# Patient Record
Sex: FEMALE | Race: WHITE | URBAN - METROPOLITAN AREA
[De-identification: names, ages, dates, MRNs, and addresses within clinical notes are randomized per-mention and may not be internally consistent; named-entity substitution may affect disease eponyms.]

---

## 2017-10-12 ENCOUNTER — OFFICE VISIT (OUTPATIENT)
Dept: INTERNAL MEDICINE | Facility: CLINIC | Age: 62
End: 2017-10-12

## 2017-10-12 ENCOUNTER — TELEPHONE (OUTPATIENT)
Dept: GASTROENTEROLOGY | Facility: CLINIC | Age: 62
End: 2017-10-12

## 2017-10-12 VITALS
RESPIRATION RATE: 20 BRPM | SYSTOLIC BLOOD PRESSURE: 127 MMHG | WEIGHT: 125.4 LBS | DIASTOLIC BLOOD PRESSURE: 80 MMHG | HEART RATE: 81 BPM | BODY MASS INDEX: 19.07 KG/M2 | OXYGEN SATURATION: 99 %

## 2017-10-12 DIAGNOSIS — Z00.00 ROUTINE HISTORY AND PHYSICAL EXAMINATION OF ADULT: Primary | ICD-10-CM

## 2017-10-12 DIAGNOSIS — Z11.59 NEED FOR HEPATITIS C SCREENING TEST: ICD-10-CM

## 2017-10-12 DIAGNOSIS — Z23 NEED FOR PROPHYLACTIC VACCINATION AND INOCULATION AGAINST INFLUENZA: ICD-10-CM

## 2017-10-12 DIAGNOSIS — Z12.11 SPECIAL SCREENING FOR MALIGNANT NEOPLASMS, COLON: ICD-10-CM

## 2017-10-12 DIAGNOSIS — N95.2 VAGINAL ATROPHY: ICD-10-CM

## 2017-10-12 DIAGNOSIS — Z12.31 VISIT FOR SCREENING MAMMOGRAM: ICD-10-CM

## 2017-10-12 DIAGNOSIS — Z83.49 FAMILY HISTORY OF THYROID DISEASE: ICD-10-CM

## 2017-10-12 DIAGNOSIS — Z12.4 SCREENING FOR MALIGNANT NEOPLASM OF CERVIX: ICD-10-CM

## 2017-10-12 ASSESSMENT — PAIN SCALES - GENERAL: PAINLEVEL: NO PAIN (0)

## 2017-10-12 NOTE — PROGRESS NOTES
SUBJECTIVE:  Octavia Casper is a 62 year old female with pmh of   Patient Active Problem List   Diagnosis     Hyperopia with presbyopia of both eyes     who comes in for preventive care examination today.   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    --best friend had an aneurysm (is doing okay)--worried that something like this could happen to her. Wonders if there are any screening tests to look for this. Occasionally gets a crampy sensation in her abdomen at night. Denies ongoing abd pain, no pulsations. No history of tobacco use. No FMH of abdominal aneurysm.  --Had BPPV 1 year ago--did Epley maneuver, symptoms improved. Vertigo recurred when she stopped exercises, so now does this regularly to help prevent vertigo.  --9 grandkids, 4 daughters    Menses:  No LMP recorded. Patient is postmenopausal.     PAP HX:   Last   Lab Results   Component Value Date    PAP NIL 06/25/2013     History of abnormal None    Breast:   Patient performs self breast exams  Yes   Breast concerns No  No results found.  Hx fibrous tissue.    Sexual Hx:  Sexually active  yes, single partner, contraception not required  Partner(s) are  male   IS NOT interested in STD testing    Pregnancy:   G  4 and P  4    Medications and allergies reviewed by me today.     Family History   Problem Relation Age of Onset     Macular Degeneration Maternal Aunt        Social History   Substance Use Topics     Smoking status: Never Smoker     Smokeless tobacco: Never Used     Alcohol use 0.5 oz/week     1 drink(s) per week      Comment: 1 glass of wine everyday       Immunization History   Administered Date(s) Administered     HepB 07/30/2014     Tetanus 09/15/2011     Zoster vaccine, live 05/04/2015         Review Of Systems  10-point ROS reviewed and was negative except otherwise noted in HPI    OBJECTIVE:    /80 (BP Location: Right arm, Patient Position: Chair, Cuff Size: Adult Regular)  Pulse 81  Resp 20  Wt  56.9 kg (125 lb 6.4 oz)  SpO2 99%  BMI 19.07 kg/m2   Wt Readings from Last 1 Encounters:   10/12/17 56.9 kg (125 lb 6.4 oz)     Constitutional: no distress, comfortable, pleasant   Eyes: anicteric, normal extra-ocular movements   Ears, Nose and Throat: tympanic membranes clear, nose clear and free of lesions, throat clear, neck supple with full range of motion, no thyromegaly.   Cardiovascular: regular rate and rhythm, normal S1 and S2, no murmurs, rubs or gallops  Respiratory: clear to auscultation, no wheezes or crackles, normal breath sounds   Gastrointestinal: positive bowel sounds, nontender, no hepatosplenomegaly, no masses   Gentiourinary: normal external genitalia, no enlargement of the Bartholin or Bolton glands, urethra normal, perineum normal and free of lesions, vaginal mucosa with mild atrophy, cervix normal without lesions, no masses or cervical motion tenderness, adnexa without enlargement or lesions  Musculoskeletal: full range of motion, no edema   Skin: no concerning lesions, no jaundice   Breast: no lumps or masses, no nipple discharge, no skin retraction or dimpling  Neurological: cranial nerves grossly intact, normal strength and sensation, reflexes at patella 2+, normal gait, no tremor   Psychological: appropriate mood, demonstrates intact judgment and logical thought processes  Lymphatic: no cervical, clavicular, or axillary lymphadenopathy    ASSESSMENT/PLAN:  Pt is a 62 year old female here for preventive examination    1. Routine history and physical examination of adult  Healthy lifestyle encouraged. Safety, sunscreen,   - Hemoglobin; Future  - Basic metabolic panel; Future    2. Need for hepatitis C screening test  Due for routine screening.  - Hepatitis C Screen Reflex to HCV RNA Quant and Genotype; Future    3. Visit for screening mammogram  Reviewed self breast exam. Due for mammogram. If normal, repeat screening in 2 years.  - MA SCREENING DIGITAL BILAT - Future  (s+30); Future    4.  Screening for malignant neoplasm of cervix  - Pap imaged thin layer screen with HPV - recommended age 30 - 65 years (select HPV order below)  - HPV High Risk Types DNA Cervical    5. Need for prophylactic vaccination and inoculation against influenza  Risks and benefits discussed, vaccine administered in clinic today.  - FLU VACCINE, 3 YRS +, IM  [[id number]]    6. Family history of thyroid disease  Given strong family hx of thyroid disease, will screen TSH today.  - TSH with free T4 reflex; Future    7. Special screening for malignant neoplasms, colon  Due for colonoscopy in the next year. She is asymptomatic, will call to schedule.  - GI Procedure Referral    8. Vaginal atrophy  Can try Premarin cream twice weekly for vaginal dryness.    FOLLOW UP: next preventive care visit    GYN TESTING:  Breast examination performed.Yes   Pelvic examination performed Yes    Pap   Yes  If normal PAP results no paps needed.  STD testing No     PREVENTATIVE TESTING:  Breast cancer screening  Indicated and Ordered  Colorectal screening indicated--within next year Ordered  Osteoporosis screening not indicated. Due for Dexa scan next year. Recommend that patient take 1200 mg calcium in divided doses and 1000 IU of vitamin D on daily basis.   Immunizations reviewed    Labs ordered BMP, TSHReflex, PAP, Hb and Hepatitis C  Counseling was provided in the following areas:  regular exercise  healthy diet/nutrition  osteoporosis prevention/bone health  self breast exam     JAI Bass CNP

## 2017-10-12 NOTE — NURSING NOTE
Chief Complaint   Patient presents with     Establish Care     establish care/provider-physical   Ana Arvizu LPN 9:21 AM on 10/12/2017

## 2017-10-12 NOTE — LETTER
Patient:  Jose Armando Casper  :   1955  MRN:     [id number]        Ms. Jose Armando Casper  [address] [facility] [address]        2017    Dear Ms. Casper,    Thank you for choosing the [facility] for your healthcare needs.  We appreciate the opportunity to serve you.    The following are your recent test results.     Hi Jose Armando,     Your pap smear was normal (no sign of concerning cervical cells). You tested negative for HPV, the virus that causes cervical cancer.     Thank [facility] for choosing [facility]. We appreciate the opportunity to serve you and look forward to supporting your healthcare needs in the future.   If you have any questions or concerns, please call me or my nurse at [phone].       Sincerely,     JAI Bass CNP     Results for orders placed or performed in visit on 10/12/17   HPV High Risk Types DNA Cervical   Result Value Ref Range    HPV 16 DNA Negative NEG^Negative    HPV 18 DNA Negative NEG^Negative    Other HR HPV Negative NEG^Negative    Final Diagnosis This patient's sample is negative for HPV DNA.     Specimen Description Cervical Cells    A pap thin layer screen   Result Value Ref Range    PAP NIL     Copath Report         Patient Name: JOSE ARMANDO CASPER  MR#: [id number]  Specimen #: [id number]  Collected: 10/12/2017  Received: 10/13/2017  Reported: 10/16/2017 10:20  Ordering Phy(s): ANNABELLE MCCORMICK    For improved result formatting, select 'View Enhanced Report Format'  under Linked Documents section.    SPECIMEN/STAIN PROCESS:  Pap thin layer prep screening (Surepath)       Pap-Cyto x 1, HPV ordered x 1    SOURCE: Cervical, endocervical  ----------------------------------------------------------------   Pap thin layer prep screening (Surepath)  SPECIMEN ADEQUACY:  Satisfactory for evaluation.  Specimen was unable to be imaged on the  FocalPoint Slide  .  -Transformation zone component absent.    CYTOLOGIC INTERPRETATION:    Negative for intraepithelial lesion or malignancy    Electronically signed out by:  LIYAH Brown  (ASCP)    Processed and screened at [facility]    CLINICAL HISTORY:    Post Menopausal, Previous normal pap  Da te of Last Pap: 6/25/2013,    Papanicolaou Test Limitations:  Cervical cytology is a screening test  with limited sensitivity; regular screening is critical for cancer  prevention; Pap tests are primarily effective for the  diagnosis/prevention of squamous cell carcinoma, not adenocarcinomas or  other cancers.    TESTING LAB LOCATION:  [facility], [address]  [address] [phone]    COLLECTION SITE:  Client:  [facility]  Location: [facility] (B)

## 2017-10-12 NOTE — NURSING NOTE
"Injectable Influenza Immunization Documentation    1.  Has the patient received the information for the injectable influenza vaccine? YES     2. Is the patient 6 months of age or older? YES     3. Does the patient have any of the following contraindications?         Severe allergy to eggs? No     Severe allergic reaction to previous influenza vaccines? No   Severe allergy to latex? No       History of Guillain-[place] syndrome? No     Currently have a temperature greater than 100.4F? No        4.  Severely egg allergic patients should have flu vaccine eligibility assessed by an MD, RN, or pharmacist, and those who received flu vaccine should be observed for 15 min by an MD, RN, Pharmacist, Medical Technician, or member of clinic staff.\": YES    5. Latex-allergic patients should be given latex-free influenza vaccine No. Please reference the Vaccine latex table to determine if your clinic s product is latex-containing.       Vaccination given by KIMBERLYN AKERS at 1:01 PM on 10/12/2017.          "

## 2017-10-12 NOTE — MR AVS SNAPSHOT
After Visit Summary   10/12/2017    Octavia Casper    MRN: [id number]           Patient Information     Date Of Birth          1955        Visit Information        Provider Department      10/12/2017 9:00 AM Roxi Jackson APRN CNP [facility]        Today's Diagnoses     Routine history and physical examination of adult    -  1    Need for hepatitis C screening test        Visit for screening mammogram        Screening for malignant neoplasm of cervix        Need for prophylactic vaccination and inoculation against influenza        Family history of thyroid disease        Special screening for malignant neoplasms, colon          Care Instructions    PCP: JAI Husain CNP  Nurse: Lizzette Walter RN            Follow-ups after your visit        Additional Services     GI Procedure Referral       Last Lab Result: No results found for: CR  Body mass index is 19.07 kg/(m^2).     Needed:  No  Language:  English    Patient will be contacted to schedule procedure.     Please be aware that coverage of these services is subject to the terms and limitations of your health insurance plan.  Call member services at your health plan with any benefit or coverage questions.  Any procedures must be performed at a [place] facility OR coordinated by your clinic's referral office.    Please bring the following with you to your appointment:    (1) Any X-Rays, CTs or MRIs which have been performed.  Contact the facility where they were done to arrange for  prior to your scheduled appointment.    (2) List of current medications   (3) This referral request   (4) Any documents/labs given to you for this referral                  Future tests that were ordered for you today     Open Future Orders        Priority Expected Expires Ordered    Hemoglobin Routine 10/12/2017 10/12/2018 10/12/2017    Basic metabolic panel Routine 10/12/2017 10/26/2017 10/12/2017    TSH with  free T4 reflex Routine 10/12/2017 10/26/2017 10/12/2017    Hepatitis C Screen Reflex to HCV RNA Quant and Genotype Routine 10/12/2017 10/12/2018 10/12/2017    MA SCREENING DIGITAL BILAT - Future  (s+30) Routine  10/12/2018 10/12/2017            Who to contact     Please call your clinic at [phone] to:    Ask questions about your health    Make or cancel appointments    Discuss your medicines    Learn about your test results    Speak to your doctor   If you have compliments or concerns about an experience at your clinic, or if you wish to file a complaint, please contact [facility] Patient Relations at [phone] or email us at [email or web address]         Additional Information About Your Visit        [employer] Information     [employer] is an electronic gateway that provides easy, online access to your medical records. With Track[employer], you can request a clinic appointment, read your test results, renew a prescription or communicate with your care team.     To sign up for [employer] visit the website at [email or web address]   You will be asked to enter the access code listed below, as well as some personal information. Please follow the directions to create your username and password.     Your access code is: [email or web address]  Expires: 2018  6:30 AM     Your access code will  in 90 days. If you need help or a new code, please contact your [facility] or call [phone] for assistance.        Care EveryWhere ID     This is your Care EveryWhere ID. This could be used by other organizations to access your [facility] medical records  [id number]        Your Vitals Were     Pulse Respirations Pulse Oximetry BMI (Body Mass Index)          81 20 99% 19.07 kg/m2         Blood Pressure from Last 3 Encounters:   10/12/17 127/80   14 119/84   13 122/78    Weight from Last 3 Encounters:   10/12/17 56.9 kg (125 lb 6.4 oz)   14 54.4 kg  (120 lb)   06/25/13 56.7 kg (124 lb 14.4 oz)              We Performed the Following     FLU VACCINE, 3 YRS +, IM  [[id number]]     GI Procedure Referral     HPV High Risk Types DNA Cervical     Pap imaged thin layer screen with HPV - recommended age 30 - 65 years (select HPV order below)        Primary Care Provider Office Phone # Fax #    JAI [facility] [phone]       [address] [facility] [address]        Equal Access to Services     [place] : Hadii aad ku hadasho Soomaali, waaxda luqadaha, qaybta kaalmada adeegyada, waxay idiin hayaan cathryn martin . So [facility] [phone].    ATENCIÓN: Si habla español, tiene a tomlin disposición servicios gratuitos de asistencia lingüística. Llame al [phone].    We comply with applicable federal civil rights laws and [place] laws. We do not discriminate on the basis of race, color, national origin, age, disability, sex, sexual orientation, or gender identity.            Thank you!     Thank you for choosing [facility]  for your care. Our goal is always to provide you with excellent care. Hearing back from our patients is one way we can continue to improve our services. Please take a few minutes to complete the written survey that you may receive in the mail after your visit with us. Thank you!             Your Updated Medication List - Protect others around you: Learn how to safely use, store and throw away your medicines at www.disposemymeds.org.          This list is accurate as of: 10/12/17  9:58 AM.  Always use your most recent med list.                   Brand Name Dispense Instructions for use Diagnosis    BABY ASPIRIN PO      Take  by mouth.        OMEGA-3 FISH OIL PO           VITAMIN C PO      Take  by mouth. 500 MG        VITAMIN D (CHOLECALCIFEROL) PO      Take by mouth daily

## 2017-10-16 ENCOUNTER — TELEPHONE (OUTPATIENT)
Dept: GASTROENTEROLOGY | Facility: CLINIC | Age: 62
End: 2017-10-16

## 2017-10-16 LAB
COPATH REPORT: NORMAL
PAP: NORMAL

## 2017-10-18 LAB
FINAL DIAGNOSIS: NORMAL
HPV HR 12 DNA CVX QL NAA+PROBE: NEGATIVE
HPV16 DNA SPEC QL NAA+PROBE: NEGATIVE
HPV18 DNA SPEC QL NAA+PROBE: NEGATIVE
SPECIMEN DESCRIPTION: NORMAL

## 2017-10-27 ENCOUNTER — TELEPHONE (OUTPATIENT)
Dept: GASTROENTEROLOGY | Facility: CLINIC | Age: 62
End: 2017-10-27

## 2017-10-27 DIAGNOSIS — Z12.11 ENCOUNTER FOR SCREENING COLONOSCOPY: Primary | ICD-10-CM

## 2017-10-31 ENCOUNTER — HOSPITAL ENCOUNTER (OUTPATIENT)
Facility: AMBULATORY SURGERY CENTER | Age: 62
End: 2017-10-31
Attending: INTERNAL MEDICINE

## 2017-10-31 ENCOUNTER — SURGERY (OUTPATIENT)
Age: 62
End: 2017-10-31

## 2017-10-31 VITALS
HEART RATE: 64 BPM | DIASTOLIC BLOOD PRESSURE: 55 MMHG | RESPIRATION RATE: 16 BRPM | TEMPERATURE: 97.8 F | HEIGHT: 68 IN | OXYGEN SATURATION: 100 % | WEIGHT: 125.4 LBS | BODY MASS INDEX: 19 KG/M2 | SYSTOLIC BLOOD PRESSURE: 106 MMHG

## 2017-10-31 LAB — COLONOSCOPY: NORMAL

## 2017-10-31 RX ORDER — SIMETHICONE
LIQUID (ML) MISCELLANEOUS PRN
Status: DISCONTINUED | OUTPATIENT
Start: 2017-10-31 | End: 2017-10-31 | Stop reason: HOSPADM

## 2017-10-31 RX ORDER — FENTANYL CITRATE 50 UG/ML
INJECTION, SOLUTION INTRAMUSCULAR; INTRAVENOUS PRN
Status: DISCONTINUED | OUTPATIENT
Start: 2017-10-31 | End: 2017-10-31 | Stop reason: HOSPADM

## 2017-10-31 RX ADMIN — FENTANYL CITRATE 25 MCG: 50 INJECTION, SOLUTION INTRAMUSCULAR; INTRAVENOUS at 10:31

## 2017-10-31 RX ADMIN — FENTANYL CITRATE 25 MCG: 50 INJECTION, SOLUTION INTRAMUSCULAR; INTRAVENOUS at 10:54

## 2017-10-31 RX ADMIN — Medication 1 ML: at 10:30

## 2017-10-31 RX ADMIN — FENTANYL CITRATE 25 MCG: 50 INJECTION, SOLUTION INTRAMUSCULAR; INTRAVENOUS at 10:21

## 2017-10-31 NOTE — LETTER
Patient:  Octavia Casper  :   1955  MRN:     [id number]        Ms. Octavia Casper  [address] [facility] [address]        November 3, 2017    Dear Ms. Casper,    Thank you for choosing the [facility] for your healthcare needs.  We appreciate the opportunity to serve you.    The following are your recent test results.     Here are your colonoscopy results. You had 4 small polyps removed (<1 cm). These were hyperplastic, or benign. You should have a repeat colonoscopy in 10 years for routine colon cancer screening.    Thank you for choosing [facility]. We appreciate the opportunity to serve you and look forward to supporting your healthcare needs in the future.  If you have any questions or concerns, please call me or my nurse at [phone].      Results for orders placed or performed during the hospital encounter of 10/31/17   COLONOSCOPY   Result Value Ref Range    COLONOSCOPY       [facility]  [address] [place] [address], [place] [address] [phone]     Endoscopy Department  _______________________________________________________________________________  Patient Name: Octavia Casper          Procedure Date: 10/31/2017 9:26 AM  MRN: [id number]                       Account Number: [id number]  YOB: 1955              Admit Type: Outpatient  Age: 62                                Gender: Female  Note Status: Finalized                Attending MD: Belem Whitfield MD  Pause for the Cause: performed with all personnel prior to procedure start Total   Sedation Time: 60 minutes of continuous 1:1 bedside sedation performed.  _______________________________________________________________________________     Procedure:           Colonoscopy  Indications:         Screening for colorectal malignant neoplasm  Providers:           Belem Whitfield MD, Lata Garces RN  Referring MD:        Roxi Chris  Hoogheem  Medicines:           Midazolam 2 mg IV, Fe ntanyl 75 micrograms IV  Complications:       No immediate complications.  _______________________________________________________________________________  Procedure:           Pre-Anesthesia Assessment:                       - Prior to the procedure, a History and Physical was                        performed, and patient medications and allergies were                        reviewed. The patient is competent. The risks and                        benefits of the procedure and the sedation options and                        risks were discussed with the patient. All questions                        were answered and informed consent was obtained. Patient                        identification and proposed procedure were verified by                        the physician and the nurse in the pre-procedure area in                        the procedure room. Mental Status Examination: alert and                        oriented. Airway Examination: normal oropharyngeal                        ai rway and neck mobility. Respiratory Examination: clear                        to auscultation. CV Examination: normal. Prophylactic                        Antibiotics: The patient does not require prophylactic                        antibiotics. Prior Anticoagulants: The patient has taken                        no previous anticoagulant or antiplatelet agents. ASA                        Grade Assessment: II - A patient with mild systemic                        disease. After reviewing the risks and benefits, the                        patient was deemed in satisfactory condition to undergo                        the procedure. The anesthesia plan was to use moderate                        sedation / analgesia (conscious sedation). Immediately                        prior to administration of medications, the patient was                        re-assessed for adequacy to receive  sedatives. The heart                        rate, respiratory rate, oxygen saturations, blood                         pressure, adequacy of pulmonary ventilation, and                        response to care were monitored throughout the                        procedure. The physical status of the patient was                        re-assessed after the procedure.                       After obtaining informed consent, the colonoscope was                        passed under direct vision. Throughout the procedure,                        the patient's blood pressure, pulse, and oxygen                        saturations were monitored continuously. The Colonoscope                        was introduced through the anus and advanced to the                        terminal ileum, with identification of the appendiceal                        orifice and IC valve. The colonoscopy was performed                        without difficulty. The colon was tortuous; procedure                        was completed successfully with supine positioning and                        abdominal pressure. The patient to lerated the procedure                        well. The quality of the bowel preparation was excellent.                                                                                   Findings:       The perianal and digital rectal examinations were normal.       The terminal ileum appeared normal.       Four sessile hyperplastic-appearing polyps were found in the rectum and        transverse colon. The polyps were less than 5 mm in size. These polyps        were removed with a cold biopsy forceps. Resection and retrieval were        complete.                                                                                   Impression:          - The examined portion of the ileum was normal.                       - Four less than 5 mm (hyperplastic appearing) polyps in                        the rectum and in the transverse  "colon, removed with a                        cold biopsy forceps. Resected and retrieved.  Recommendation:      - Await pathology results.                       -  Repeat colonoscopy for surveillance based on pathology                        results.                       - Continue present medications.                       - Resume previous diet.                       - Return to referring physician.                       - Discharge patient to home (ambulatory).                                                                                     Electronically signed by: Belem Whitfield MD  __________________  Belem Whitfield MD  10/31/2017 11:33:25 AM  I was physically present for the entire viewing portion of the exam.  __________________________  Signature of teaching physician  Belem Whitfield MD  Number of Addenda: 0    Note Initiated On: 10/31/2017 9:26 AM  Scope In:  Scope Out:     Surgical pathology exam   Result Value Ref Range    Copath Report       Patient Name: JOSE ARMANDO GONZALEZ  MR#: [id number]  Specimen #: [id number]  Collected: 10/31/2017  Received: 10/31/2017  Reported: 11/2/2017 12:37  Ordering Phy(s): BELEM WHITFIELD    For improved result formatting, select 'View Enhanced Report Format'  under Linked Documents section.    SPECIMEN(S):  A: Colon polyp, transverse  B: Rectal polyp    FINAL DIAGNOSIS:  A. COLON POLYP, TRANSVERSE, POLYPECTOMY x2:  - Hyperplastic polyp x 2    B. RECTUM, POLYP, POLYPECTOMY x2:  - Hyperplastic polyp x 2    I have personally reviewed all specimens and/or slides, including the  listed special stains, and used them with my medical judgement to  determine or confirm the final diagnosis.    Electronically signed out by:    Casey Mansfield M.D., PhD, Physicians    CLINICAL HISTORY:  Screening  GROSS:  A:  The specimen is received in formalin with proper patient  identification, labeled \"large intestine, transverse x2\", and consists  of two tan soft tissue fragments, 0.4 cm " "and 0.5 cm in greatest  dimen medina.  The specimen is wrapped and entirely submitted in cassette  A1.    B:  The specimen is received in formalin with proper patient  identification, labeled \"large intestine, rectum x2\", and consists of  one tan soft tissue fragment, 0.5 cm in greatest dimension.  The  specimen is wrapped and entirely submitted in cassette B1. (Dictated by:  Lisha Boyle 10/31/2017 01:45 PM)    MICROSCOPIC:  Microscopic examination was performed.    CPT Codes:  A: 34342-FT6  B: 18929-ER3    TESTING LAB LOCATION:  [facility], [address]   [address]  [phone]    COLLECTION SITE:  Client: [facility]  Location: [facility] (B)    Resident  [id number]         Please contact your provider if you have any questions or concerns.  We look forward to serving your needs in the future.      Sincerely,    JAI Bass CNP        "

## 2017-11-02 LAB — COPATH REPORT: NORMAL

## 2018-01-26 ENCOUNTER — OFFICE VISIT (OUTPATIENT)
Dept: OPHTHALMOLOGY | Facility: CLINIC | Age: 63
End: 2018-01-26
Payer: COMMERCIAL

## 2018-01-26 DIAGNOSIS — H52.03 HYPERMETROPIA OF BOTH EYES: Primary | ICD-10-CM

## 2018-01-26 DIAGNOSIS — H25.13 SENILE NUCLEAR SCLEROSIS, BILATERAL: ICD-10-CM

## 2018-01-26 DIAGNOSIS — H40.013 OAG (OPEN ANGLE GLAUCOMA) SUSPECT, LOW RISK, BILATERAL: ICD-10-CM

## 2018-01-26 ASSESSMENT — EXTERNAL EXAM - LEFT EYE: OS_EXAM: NORMAL

## 2018-01-26 ASSESSMENT — CONF VISUAL FIELD
METHOD: COUNTING FINGERS
OS_NORMAL: 1
OD_NORMAL: 1

## 2018-01-26 ASSESSMENT — REFRACTION_MANIFEST
OD_ADD: +1.75
OD_AXIS: 002
OD_SPHERE: +1.50
OS_CYLINDER: +1.00
OS_ADD: +1.75
OS_AXIS: 180
OD_CYLINDER: +0.75
OS_SPHERE: +1.50

## 2018-01-26 ASSESSMENT — REFRACTION_WEARINGRX
SPECS_TYPE: PAL
OS_AXIS: 027
OS_ADD: +1.50
OD_CYLINDER: +0.75
OS_CYLINDER: +0.75
OS_SPHERE: +1.50
OD_ADD: +1.50
OD_SPHERE: +1.25
OD_AXIS: 178

## 2018-01-26 ASSESSMENT — TONOMETRY
IOP_METHOD: TONOPEN
OD_IOP_MMHG: 13
OS_IOP_MMHG: 13

## 2018-01-26 ASSESSMENT — CUP TO DISC RATIO
OD_RATIO: 0.60
OS_RATIO: 0.55

## 2018-01-26 ASSESSMENT — VISUAL ACUITY
METHOD: SNELLEN - LINEAR
OD_CC: 20/20
OD_CC: J1+
OS_CC+: -1
OS_CC: J1+
OS_CC: 20/20

## 2018-01-26 ASSESSMENT — SLIT LAMP EXAM - LIDS
COMMENTS: NORMAL
COMMENTS: NORMAL

## 2018-01-26 ASSESSMENT — EXTERNAL EXAM - RIGHT EYE: OD_EXAM: NORMAL

## 2018-01-26 NOTE — PROGRESS NOTES
Assessment/Plan  (H52.03) Hypermetropia of both eyes  (primary encounter diagnosis)  Comment: Hyperopia with presbyopia OU  Plan: REFRACTION [05774]         Educated patient on condition and clinical findings. Dispensed spectacle prescription for full time wear. Educated patient on possibility of adaptation period, if symptoms do not improve return to clinic for further testing.    (H25.13) Senile nuclear sclerosis, bilateral  Comment: Not visually significant  Plan:  No treatment indicated. Monitor annually.    (H40.013) OAG (open angle glaucoma) suspect, low risk, bilateral  Comment: Slight asymmetry OU, both nerves appear healthy, stable to previous exam.  Plan:  Monitor annually, consider repeat OCT at future visit.    Return to clinic in 1 year for comprehensive exam.    Complete documentation of historical and exam elements from today's encounter can  be found in the full encounter summary report (not reduplicated in this progress  note). I personally obtained the chief complaint(s) and history of present illness. I  confirmed and edited as necessary the review of systems, past medical/surgical  history, family history, social history, and examination findings as documented by  others; and I examined the patient myself. I personally reviewed the relevant tests,  images, and reports as documented above. I formulated and edited as necessary the  assessment and plan and discussed the findings and management plan with the  patient and family.    Manuel Liang OD, FAAO

## 2018-01-26 NOTE — NURSING NOTE
Chief Complaints and History of Present Illnesses   Patient presents with     Follow Up For     Annual eye exam     HPI    Last Eye Exam:  7/21/16   Additional Referring Providers:  Dr Hoskins   Affected eye(s):  Both   Symptoms:        Unknown duration    Frequency:  Constant       Do you have eye pain now?:  No      Comments:  Octavia is here today for her annual eye exam.  She says her vision is good overall, but it has been a couple of years since her last exam  She denies flashes or floaters.    Tyson Dodge COT 8:53 AM January 26, 2018

## 2018-01-26 NOTE — MR AVS SNAPSHOT
After Visit Summary   1/26/2018    Octavia Casper    MRN: [id number]           Patient Information     Date Of Birth          1955        Visit Information        Provider Department      1/26/2018 9:00 AM Manuel Liang, [facility]        Today's Diagnoses     Hypermetropia of both eyes    -  1    Senile nuclear sclerosis, bilateral        OAG (open angle glaucoma) suspect, low risk, bilateral           Follow-ups after your visit        Follow-up notes from your care team     Return in about 1 year (around 1/26/2019) for Comprehensive Eye Exam.      Who to contact     Please call your clinic at [phone] to:    Ask questions about your health    Make or cancel appointments    Discuss your medicines    Learn about your test results    Speak to your doctor   If you have compliments or concerns about an experience at your clinic, or if you wish to file a complaint, please contact [facility] Patient Relations at [phone] or email us at [email or web address]         Additional Information About Your Visit        MyChart Information     [employer] [employer] gives you secure access to your electronic health record. If you see a primary care provider, you can also send messages to your care team and make appointments. If you have questions, please call your primary care clinic.  If you do not have a primary care provider, please call [phone] and they will assist you.      [employer] is an electronic gateway that provides easy, online access to your medical records. With [employer], you can request a clinic appointment, read your test results, renew a prescription or communicate with your care team.     To access your existing account, please contact your [facility] or call [phone] for assistance.        Care EveryWhere ID     This is your Care EveryWhere ID. This could be used by other organizations to access your  [place] medical records  [id number]         Blood Pressure from Last 3 Encounters:   10/31/17 106/55   10/12/17 127/80   07/30/14 119/84    Weight from Last 3 Encounters:   10/31/17 56.9 kg (125 lb 6.4 oz)   10/12/17 56.9 kg (125 lb 6.4 oz)   07/30/14 54.4 kg (120 lb)              We Performed the Following     REFRACTION [12407]        Primary Care Provider Office Phone # Fax #    Roxi Chris PratikJAI jameson [facility] [phone] [address] [facility] [address]        Equal Access to Services     [place] : Hadii rebecca Abdullahi, waaxda luqadaha, qaybta kaalmada adenissa, lynn martin . So [facility] [phone].    ATENCIÓN: Si habla español, tiene a tomlin disposición servicios gratuitos de asistencia lingüística. Llame al [phone].    We comply with applicable federal civil rights laws and [place] laws. We do not discriminate on the basis of race, color, national origin, age, disability, sex, sexual orientation, or gender identity.            Thank you!     Thank you for choosing [facility]  for your care. Our goal is always to provide you with excellent care. Hearing back from our patients is one way we can continue to improve our services. Please take a few minutes to complete the written survey that you may receive in the mail after your visit with us. Thank you!             Your Updated Medication List - Protect others around you: Learn how to safely use, store and throw away your medicines at www.disposemymeds.org.          This list is accurate as of 1/26/18  3:24 PM.  Always use your most recent med list.                   Brand Name Dispense Instructions for use Diagnosis    BABY ASPIRIN PO      Take  by mouth.        conjugated estrogens cream    PREMARIN    30 g    Place 0.5 g vaginally twice a week    Vaginal atrophy       OMEGA-3 FISH OIL PO           VITAMIN C PO      Take  by mouth. 500 MG        VITAMIN D (CHOLECALCIFEROL) PO       Take by mouth daily

## 2020-03-01 ENCOUNTER — HEALTH MAINTENANCE LETTER (OUTPATIENT)
Age: 65
End: 2020-03-01

## 2020-04-22 NOTE — TELEPHONE ENCOUNTER
Message left for patient to call back for procedure pre-assessment.    Ana Sumner RN    
Patient scheduled for colonoscopy    Indication for procedure. screening    Referring Provider DEUCE Trejo    ? no    Arrival time verified? yes    Facility location verified? [facility] [address] [place] [address]    Instructions given regarding prep and procedure    Prep Type yes, golytely    Are you taking any anticoagulants or blood thinners? no    Instructions given? yes    Electronic implanted devices? no    Pre procedure teaching completed? Yes    Transportation from procedure? yes    H&P / Pre op physical completed? NA    Ana Sumner RN          
Abdomen soft, non-tender, no guarding.

## 2020-12-13 ENCOUNTER — HEALTH MAINTENANCE LETTER (OUTPATIENT)
Age: 65
End: 2020-12-13

## 2021-04-17 ENCOUNTER — HEALTH MAINTENANCE LETTER (OUTPATIENT)
Age: 66
End: 2021-04-17

## 2021-09-26 ENCOUNTER — HEALTH MAINTENANCE LETTER (OUTPATIENT)
Age: 66
End: 2021-09-26

## 2022-03-13 ENCOUNTER — HEALTH MAINTENANCE LETTER (OUTPATIENT)
Age: 67
End: 2022-03-13

## 2022-05-08 ENCOUNTER — HEALTH MAINTENANCE LETTER (OUTPATIENT)
Age: 67
End: 2022-05-08

## 2023-01-14 ENCOUNTER — HEALTH MAINTENANCE LETTER (OUTPATIENT)
Age: 68
End: 2023-01-14

## 2023-06-02 ENCOUNTER — HEALTH MAINTENANCE LETTER (OUTPATIENT)
Age: 68
End: 2023-06-02

## (undated) DEVICE — ENDO CAP AND TUBING STERILE FOR ENDOGATOR  100130

## (undated) DEVICE — SUCTION MANIFOLD NEPTUNE 2 SYS 4 PORT 0702-020-000

## (undated) DEVICE — SOL WATER IRRIG 1000ML BOTTLE 2F7114

## (undated) DEVICE — ENDO FORCEP ENDOJAW BIOPSY 2.8MMX230CM FB-220U

## (undated) DEVICE — SPECIMEN CONTAINER 3OZ W/FORMALIN 59901

## (undated) RX ORDER — FENTANYL CITRATE 50 UG/ML
INJECTION, SOLUTION INTRAMUSCULAR; INTRAVENOUS
Status: DISPENSED
Start: 2017-10-31